# Patient Record
Sex: MALE | Race: WHITE | NOT HISPANIC OR LATINO | Employment: FULL TIME | URBAN - METROPOLITAN AREA
[De-identification: names, ages, dates, MRNs, and addresses within clinical notes are randomized per-mention and may not be internally consistent; named-entity substitution may affect disease eponyms.]

---

## 2023-09-21 PROBLEM — R31.9 HEMATURIA: Status: ACTIVE | Noted: 2023-09-21

## 2023-09-21 PROBLEM — R73.03 PREDIABETES: Status: ACTIVE | Noted: 2023-09-21

## 2023-09-21 PROBLEM — E66.3 OVERWEIGHT (BMI 25.0-29.9): Status: ACTIVE | Noted: 2023-09-21

## 2023-09-21 PROBLEM — R03.0 ELEVATED BLOOD PRESSURE READING: Status: ACTIVE | Noted: 2023-09-21

## 2023-09-21 PROBLEM — C67.9 BLADDER CANCER (MULTI): Status: ACTIVE | Noted: 2023-09-21

## 2023-09-21 PROBLEM — N28.1 RENAL CYST: Status: ACTIVE | Noted: 2023-09-21

## 2023-09-21 RX ORDER — PENICILLIN V POTASSIUM 500 MG/1
500 TABLET, FILM COATED ORAL
COMMUNITY
Start: 2022-07-15 | End: 2023-10-12

## 2023-10-12 ENCOUNTER — OFFICE VISIT (OUTPATIENT)
Dept: OTOLARYNGOLOGY | Facility: CLINIC | Age: 68
End: 2023-10-12
Payer: MEDICARE

## 2023-10-12 VITALS — WEIGHT: 215 LBS | BODY MASS INDEX: 28.37 KG/M2

## 2023-10-12 DIAGNOSIS — Z71.1 FEARED CONDITION NOT DEMONSTRATED: Primary | ICD-10-CM

## 2023-10-12 PROCEDURE — 99203 OFFICE O/P NEW LOW 30 MIN: CPT | Performed by: GENERAL PRACTICE

## 2023-10-12 PROCEDURE — 1159F MED LIST DOCD IN RCRD: CPT | Performed by: GENERAL PRACTICE

## 2023-10-12 PROCEDURE — 1036F TOBACCO NON-USER: CPT | Performed by: GENERAL PRACTICE

## 2023-10-12 ASSESSMENT — PATIENT HEALTH QUESTIONNAIRE - PHQ9
SUM OF ALL RESPONSES TO PHQ9 QUESTIONS 1 AND 2: 0
2. FEELING DOWN, DEPRESSED OR HOPELESS: NOT AT ALL
1. LITTLE INTEREST OR PLEASURE IN DOING THINGS: NOT AT ALL

## 2023-10-14 NOTE — PROGRESS NOTES
Otolaryngology - Head and Neck Surgery Outpatient New Patient Visit Note         History Of Present Illness  Eron Kaufman is a 68 y.o. male presenting for evaluation of  of concerns for cerumen impaction.     Reports history of cerumen impaction and some recent ear fullness.  Mild hearing loss     No otalgia, otorrhea, vertigo.     Denies significant history fo recurrent otitis, prior ear trauma or ear surgery,             Past Medical History  He has a past medical history of Other conditions influencing health status.    Surgical History  He has a past surgical history that includes Other surgical history (10/14/2021).     Social History  He reports that he has never smoked. He has never used smokeless tobacco. No history on file for alcohol use and drug use.    Family History  Family History   Problem Relation Name Age of Onset    No Known Problems Mother      No Known Problems Father          Allergies  Patient has no known allergies.    Review of Systems  ROS: Pertinent positives as noted in HPI.    - CONSTITUTIONAL: Does not report weight loss, fever or chills.    - HEENT:   Ear: Does not report tinnitus, vertigo,    , otalgia, otorrhea  Nose: Does not report congestion, rhinorrhea, epistaxis, decreased smell  Throat: Does not report pain, dysphagia, odynophagia  Larynx: Does not report hoarseness,  difficulty breathing, pain with speaking (odynophonia)  Neck: Does not report new masses, pain, swelling  Face: Does not report sinus pain, pressure, swelling, numbness, weakness     - RESPIRATORY: Does not report SOB or cough.    - CV: Does not report palpitations or chest pain.     - GI: Does not report abdominal pain, nausea, vomiting or diarrhea.    - : Does not report dysuria or urinary frequency.    - MSK: Does not report myalgia or joint pain.    - SKIN: Does not report rash or pruritus.    - NEUROLOGICAL: Does not report headache or syncope.    - PSYCHIATRIC: Does not report recent changes in mood. Does  not report anxiety or depression.         Physical Exam:     GENERAL:   Alert & Oriented to person, place and time; Normal affect and appearance. Well developed and well nourished. Conversant & cooperative with examination.     HEAD:   Normocephalic, atraumatic. No sinus tenderness to palpation. Normal parotid bilaterally. Normal facial strength.     NEUROLOGIC:   Cranial nerves II-XII grossly intact, gait WNL. Normal mood and affect.    EYES:   Extraocular movements intact. Pupils equal, round, reactive to light and accommodation. No nystagmus, no ptosis. no scleral injection.    EAR:   Normal auricle. No discomfort or TTP with manipulation.   Handheld otoscopic exam showed normal external auditory canals bilaterally. No purulence or EAC inflammation. Minimal cerumen.   Right tympanic membrane clear and mobile without evidence of perforation, retraction or middle ear effusion.   Left tympanic membrane clear and mobile without evidence of perforation, retraction or middle ear effusion.     NOSE:   No external deformity. No external nasal lesions, lacerations, or scars. Nasal tip symmetrical with normal nasal valves.   Nasal cavity with essentially midline septum, normal mucosa and turbinates. No lesions, masses, purulence or polyps.     OC/OP:   Mucous membranes moist, no masses, lesions or exudates.   Normal tongue, floor of mouth, teeth, gums, lips. Normal posterior pharyngeal wall.    Normal tonsils without erythema, exudate or obvious calculi     NECK:   No neck masses or thyroid enlargement. Trachea midline. No tenderness to palpation    LYMPHATIC:   No cervical lymphadenopathy.     RESPIRATORY:   Symmetric chest elevation & no retractions. No significant hoarseness. No increased work of breathing.    CV:   No clubbing or cyanosis. No obvious edema    Skin:   No facial rashes, vesicles or lesions.     Extremities:   No gross abnormalities      Clinic Procedure        Information review:  External sources  (notes, imaging, lab results) listed below personally reviewed to aid in medical decision making process.  -  -  -      Assessment/Plan   Problem List Items Addressed This Visit    None  Visit Diagnoses         Codes    Feared condition not demonstrated    -  Primary Z71.1          Concerns for cerumen, but ears clear and no evidence of otitis, ETD.  Discussed possible underlying hearing loss and possible audiogram, but pt declines.  Discussed possible TMJ, neck tension or other etiologies causing aural fullness.  Pt will observe and RTC if symptoms persist.       Follow up:  -plan for follow up in clinic as needed    All of the above findings, impressions, treatment planning and follow up plans were discussed with the patient who indicated understanding.  the patient was instructed to contact or return to clinic sooner if symptoms/signs persist or worsen despite the above management.      Miguel Stafford MD  Otolaryngology - Head and Neck Surgery

## 2023-10-19 NOTE — PROGRESS NOTES
Patient:   Eron Kaufman is a 68 y.o. year old male patient here for 6-month FUV       Hx:  high grade bladder cancer s/p BCG treatments, diagnosed 09/2017 with no recurrences since, and Bosniak type 2 right renal cyst.     Medications:     Recent PSA: 0.78 on 4/6/2022    Recent imaging:    Patient concerns and symptoms:    Cystoscopy was completed today due to TCC surveillance and was unremarkable. Cytology sent. Patient instructed to followup in 6 months for continued cystoscopic surveillance. Will continue to monitor 6 months until 9/2025, and yearly if no recurrences by year 5.     Plan: Follow up for repeat cystoscopy in 6 months with a renal US prior.    Scribe Attestation:  By signing my name below, Oanh MENCHACA Scribe   attest that this documentation has been prepared under the direction and in the presence of Manohar Hurtado MD.

## 2023-10-20 ENCOUNTER — PROCEDURE VISIT (OUTPATIENT)
Dept: UROLOGY | Facility: HOSPITAL | Age: 68
End: 2023-10-20
Payer: MEDICARE

## 2023-10-20 VITALS
HEART RATE: 57 BPM | BODY MASS INDEX: 29.12 KG/M2 | SYSTOLIC BLOOD PRESSURE: 149 MMHG | WEIGHT: 215 LBS | HEIGHT: 72 IN | DIASTOLIC BLOOD PRESSURE: 100 MMHG

## 2023-10-20 DIAGNOSIS — C67.9 MALIGNANT NEOPLASM OF URINARY BLADDER, UNSPECIFIED SITE (MULTI): Primary | ICD-10-CM

## 2023-10-20 LAB
POC APPEARANCE, URINE: CLEAR
POC BILIRUBIN, URINE: NEGATIVE
POC BLOOD, URINE: NEGATIVE
POC COLOR, URINE: YELLOW
POC GLUCOSE, URINE: NEGATIVE MG/DL
POC KETONES, URINE: NEGATIVE MG/DL
POC LEUKOCYTES, URINE: NEGATIVE
POC NITRITE,URINE: NEGATIVE
POC PH, URINE: 5.5 PH
POC PROTEIN, URINE: NEGATIVE MG/DL
POC SPECIFIC GRAVITY, URINE: >=1.03
POC UROBILINOGEN, URINE: 0.2 EU/DL

## 2023-10-20 PROCEDURE — 52000 CYSTOURETHROSCOPY: CPT | Performed by: UROLOGY

## 2023-10-20 PROCEDURE — 81003 URINALYSIS AUTO W/O SCOPE: CPT | Mod: QW | Performed by: UROLOGY

## 2023-10-20 PROCEDURE — 88112 CYTOPATH CELL ENHANCE TECH: CPT | Mod: TC,MCY | Performed by: UROLOGY

## 2023-10-20 PROCEDURE — 88112 CYTOPATH CELL ENHANCE TECH: CPT | Performed by: PATHOLOGY

## 2023-10-26 LAB
LABORATORY COMMENT REPORT: NORMAL
LABORATORY COMMENT REPORT: NORMAL
PATH REPORT.FINAL DX SPEC: NORMAL
PATH REPORT.GROSS SPEC: NORMAL
PATH REPORT.RELEVANT HX SPEC: NORMAL
PATH REPORT.TOTAL CANCER: NORMAL

## 2023-12-11 DIAGNOSIS — C67.9 MALIGNANT NEOPLASM OF URINARY BLADDER, UNSPECIFIED SITE (MULTI): ICD-10-CM

## 2024-04-01 ENCOUNTER — HOSPITAL ENCOUNTER (OUTPATIENT)
Dept: RADIOLOGY | Facility: CLINIC | Age: 69
Discharge: HOME | End: 2024-04-01
Payer: MEDICARE

## 2024-04-01 DIAGNOSIS — C67.9 MALIGNANT NEOPLASM OF URINARY BLADDER, UNSPECIFIED SITE (MULTI): ICD-10-CM

## 2024-04-01 PROCEDURE — 76770 US EXAM ABDO BACK WALL COMP: CPT | Performed by: STUDENT IN AN ORGANIZED HEALTH CARE EDUCATION/TRAINING PROGRAM

## 2024-04-01 PROCEDURE — 76770 US EXAM ABDO BACK WALL COMP: CPT

## 2024-04-02 NOTE — PROGRESS NOTES
"FUV    Last visit - 10/20/23     HISTORY OF PRESENT ILLNESS:   Eron Kaufman is a 69 y.o. male who is being seen today for continued cystoscopic surveillance.    PAST MEDICAL HISTORY:  Past Medical History:   Diagnosis Date    Other conditions influencing health status     No significant past medical history   - High grade bladder cancer s/p BCG treatments, diagnosed 09/2017 with no recurrences since  - Bosniak type 2 right renal cyst    PAST SURGICAL HISTORY:  Past Surgical History:   Procedure Laterality Date    OTHER SURGICAL HISTORY  10/14/2021    Bladder surgery     ALLERGIES:   No Known Allergies     MEDICATIONS:   No current outpatient medications     PHYSICAL EXAM:  There were no vitals taken for this visit.  Constitutional: Patient appears well-developed and well-nourished. No distress.    Pulmonary/Chest: Effort normal. No respiratory distress.   Abdominal: Soft, ND NT  : WNL  Musculoskeletal: Normal range of motion.    Neurological: Alert and oriented to person, place, and time.  Psychiatric: Normal mood and affect. Behavior is normal. Thought content normal.      Labs:  No results found for: \"TESTOSTERONE\"  Lab Results   Component Value Date    PSA 0.78 04/06/2022     No components found for: \"CBC\"  Lab Results   Component Value Date    CREATININE 1.10 04/06/2022     No components found for: \"TESTOTMS\"  No results found for: \"TESTF\"    Imaging:  - Renal US on 4/1/24 demonstrated Ill-defined right midpole cortical predominant hypoechoic area measuring 2.5 cm, not demonstrated on the prior CT kidney but shown  on the prior renal ultrasound. This could represent a prominent calyx versus occult mass. Consider further evaluation with contrast-enhanced MRI kidney.  - Results reviewed with patient. Patient reassured.     Discussion:  Doing well, no complaints. Denies any dysuria, hematuria, bothersome urinary frequency, urgency, or flank pain. Cystoscopy was completed today due to TCC surveillance and was " unremarkable. Cytology sent. Patient instructed to followup in 6 months for continued cystoscopic surveillance. Will plan for triple phase CT prior to 6 month cystoscopy follow up.    Cystoscopy schedule: q6 months until 9/2025, and yearly if no recurrences by year 5.     Assessment:    No diagnosis found.  Bladder cancer  Eron Kaufman is a 69 y.o. male here for FUV     Plan:   1) Follow up for repeat cystoscopy in 6 months with a triple phase CT prior.  2) All questions and concerns were addressed. Patient verbalizes understanding and has no other questions at this time.     Scribe Attestation:  By signing my name below, Michelle MENCHACA Scribe   attest that this documentation has been prepared under the direction and in the presence of Manohar Hurtado MD.

## 2024-04-04 ENCOUNTER — PROCEDURE VISIT (OUTPATIENT)
Dept: UROLOGY | Facility: HOSPITAL | Age: 69
End: 2024-04-04
Payer: MEDICARE

## 2024-04-04 VITALS — SYSTOLIC BLOOD PRESSURE: 164 MMHG | DIASTOLIC BLOOD PRESSURE: 95 MMHG | HEART RATE: 114 BPM

## 2024-04-04 DIAGNOSIS — Z79.2 PROPHYLACTIC ANTIBIOTIC: ICD-10-CM

## 2024-04-04 DIAGNOSIS — C67.9 MALIGNANT NEOPLASM OF URINARY BLADDER, UNSPECIFIED SITE (MULTI): Primary | ICD-10-CM

## 2024-04-04 PROCEDURE — 52000 CYSTOURETHROSCOPY: CPT | Performed by: UROLOGY

## 2024-04-04 PROCEDURE — 88112 CYTOPATH CELL ENHANCE TECH: CPT | Mod: TC,MCY | Performed by: UROLOGY

## 2024-04-04 PROCEDURE — 88112 CYTOPATH CELL ENHANCE TECH: CPT | Mod: TC,59 | Performed by: PATHOLOGY

## 2024-04-04 PROCEDURE — 99214 OFFICE O/P EST MOD 30 MIN: CPT | Performed by: UROLOGY

## 2024-04-04 PROCEDURE — 81003 URINALYSIS AUTO W/O SCOPE: CPT | Performed by: UROLOGY

## 2024-04-04 RX ORDER — CIPROFLOXACIN 500 MG/1
500 TABLET ORAL ONCE
Status: SHIPPED | OUTPATIENT
Start: 2024-04-04

## 2024-09-19 ENCOUNTER — LAB (OUTPATIENT)
Dept: LAB | Facility: LAB | Age: 69
End: 2024-09-19
Payer: MEDICARE

## 2024-09-19 DIAGNOSIS — C67.9 MALIGNANT NEOPLASM OF URINARY BLADDER, UNSPECIFIED SITE (MULTI): ICD-10-CM

## 2024-09-19 LAB
CREAT SERPL-MCNC: 1.12 MG/DL (ref 0.5–1.3)
EGFRCR SERPLBLD CKD-EPI 2021: 72 ML/MIN/1.73M*2

## 2024-09-19 PROCEDURE — 82565 ASSAY OF CREATININE: CPT

## 2024-09-19 PROCEDURE — 36415 COLL VENOUS BLD VENIPUNCTURE: CPT

## 2024-09-23 ENCOUNTER — HOSPITAL ENCOUNTER (OUTPATIENT)
Dept: RADIOLOGY | Facility: CLINIC | Age: 69
Discharge: HOME | End: 2024-09-23
Payer: MEDICARE

## 2024-09-23 DIAGNOSIS — C67.9 MALIGNANT NEOPLASM OF URINARY BLADDER, UNSPECIFIED SITE (MULTI): ICD-10-CM

## 2024-09-23 PROCEDURE — 2550000001 HC RX 255 CONTRASTS: Performed by: UROLOGY

## 2024-09-23 PROCEDURE — 74170 CT ABD WO CNTRST FLWD CNTRST: CPT | Performed by: RADIOLOGY

## 2024-09-23 PROCEDURE — 74170 CT ABD WO CNTRST FLWD CNTRST: CPT

## 2024-10-03 ENCOUNTER — APPOINTMENT (OUTPATIENT)
Dept: UROLOGY | Facility: HOSPITAL | Age: 69
End: 2024-10-03
Payer: MEDICARE

## 2024-10-15 RX ORDER — CIPROFLOXACIN 500 MG/1
500 TABLET ORAL ONCE
Status: COMPLETED | OUTPATIENT
Start: 2024-10-15 | End: 2024-10-18

## 2024-10-18 ENCOUNTER — PROCEDURE VISIT (OUTPATIENT)
Dept: UROLOGY | Facility: HOSPITAL | Age: 69
End: 2024-10-18
Payer: MEDICARE

## 2024-10-18 VITALS — SYSTOLIC BLOOD PRESSURE: 168 MMHG | DIASTOLIC BLOOD PRESSURE: 97 MMHG | HEART RATE: 84 BPM

## 2024-10-18 DIAGNOSIS — C67.9 MALIGNANT NEOPLASM OF URINARY BLADDER, UNSPECIFIED SITE (MULTI): Primary | ICD-10-CM

## 2024-10-18 DIAGNOSIS — Z79.2 PROPHYLACTIC ANTIBIOTIC: ICD-10-CM

## 2024-10-18 PROCEDURE — 81003 URINALYSIS AUTO W/O SCOPE: CPT | Performed by: UROLOGY

## 2024-10-18 PROCEDURE — 52000 CYSTOURETHROSCOPY: CPT | Performed by: UROLOGY

## 2024-10-18 PROCEDURE — 2500000001 HC RX 250 WO HCPCS SELF ADMINISTERED DRUGS (ALT 637 FOR MEDICARE OP): Performed by: UROLOGY

## 2024-10-18 NOTE — PROGRESS NOTES
"FUV    Last visit - 04/04/24     HISTORY OF PRESENT ILLNESS:   Eron Kaufman is a 69 y.o. male who is being seen today for results follow up.    PAST MEDICAL HISTORY:  Past Medical History:   Diagnosis Date    Other conditions influencing health status     No significant past medical history   - High grade bladder cancer s/p BCG treatments, diagnosed 09/2017 with no recurrences since  - Bosniak type 2 right renal cyst    PAST SURGICAL HISTORY:  Past Surgical History:   Procedure Laterality Date    OTHER SURGICAL HISTORY  10/14/2021    Bladder surgery     ALLERGIES:   No Known Allergies     MEDICATIONS:   No current outpatient medications     PHYSICAL EXAM:  Blood pressure (!) 168/97, pulse 84.  Constitutional: Patient appears well-developed and well-nourished. No distress.    Pulmonary/Chest: Effort normal. No respiratory distress.   Abdominal: Soft, ND NT  : WNL  Musculoskeletal: Normal range of motion.    Neurological: Alert and oriented to person, place, and time.  Psychiatric: Normal mood and affect. Behavior is normal. Thought content normal.      Labs:  No results found for: \"TESTOSTERONE\"  Lab Results   Component Value Date    PSA 0.78 04/06/2022     No components found for: \"CBC\"  Lab Results   Component Value Date    CREATININE 1.12 09/19/2024     No components found for: \"TESTOTMS\"  No results found for: \"TESTF\"    Imaging:  -Renal CT w and w/o contrast demonstrated:   1. At least 3 small subcentimeter low-attenuation lesions in the  right kidney which may represent cysts but too small to fully  characterize. Correlation with ultrasound findings recommended.  2. Normal appearance of the left kidney.  3. Lorena mesentery appearance similar to prior and subcentimeter  periportal and peripancreatic lymph nodes, nonspecific in etiology.  Findings overall stable.  4. Circumferential wall thickening of the terminal ileum which is  incompletely included in field of view and may represent a sequela of  peristalsis, " however, inflammatory or neoplastic process not  excluded. Recommend clinical correlation and dedicated CT scan of the  abdomen and/or colonoscopy.  -Results reviewed with patient today.     - Renal US on 4/1/24 demonstrated Ill-defined right midpole cortical predominant hypoechoic area measuring 2.5 cm, not demonstrated on the prior CT kidney but shown  on the prior renal ultrasound. This could represent a prominent calyx versus occult mass. Consider further evaluation with contrast-enhanced MRI kidney.  - Results reviewed with patient. Patient reassured.     Discussion:  Doing well, no complaints. Denies any dysuria, hematuria, bothersome urinary frequency, urgency, or flank pain. We discussed his CT results prior to completing his cystoscopy today.   His CT showed some stable cysts but no other abnormalities. There is persistent apperance of inflammation of the intestine. Patient was encouraged to follow up with his PCP to determine if further evaluation is necessary. We are going to continue with his current surveillance. He will complete a PSA panel prior to his next follow up.     Informed consent was obtained.  He was prepped and draped in a standard sterile fashion.  Flexible cystoscope was advanced per urethra.  Pendulous urethra was normal.  Bulbar urethra was normal.  External sphincter had normal strength.  Prostate was short and nonobstructive.  Bladder was entered and distended.  There were no intravesical lesions, foreign bodies, stones, or tumors.  The scope was removed.  Patient tolerated the procedure well.    Cystoscopy schedule: q6 months until 9/2025, and yearly if no recurrences by year 5.     Assessment:      1. Malignant neoplasm of urinary bladder, unspecified site (Multi)  Cytology Consultation (Non-Gynecologic)    Cystoscopy    Cystoscopy    Cystoscopy    POCT UA Automated manually resulted    CANCELED: Measure post void residual      2. Prophylactic antibiotic  ciprofloxacin (Cipro)  tablet 500 mg        Bladder cancer  Eron Kaufman is a 69 y.o. male here for FUV     Plan:   1) Follow up for repeat cystoscopy in 6 months with PSA panel prior  2) All questions and concerns were addressed. Patient verbalizes understanding and has no other questions at this time.     Scribe Attestation  By signing my name below, I, Zenaida Rodríguez   attest that this documentation has been prepared under the direction and in the presence of Manohar Hurtado MD.

## 2024-10-22 LAB
LABORATORY COMMENT REPORT: NORMAL
LABORATORY COMMENT REPORT: NORMAL
PATH REPORT.FINAL DX SPEC: NORMAL
PATH REPORT.RELEVANT HX SPEC: NORMAL
PATH REPORT.TOTAL CANCER: NORMAL

## 2025-04-09 RX ORDER — CIPROFLOXACIN 500 MG/1
500 TABLET ORAL ONCE
Status: COMPLETED | OUTPATIENT
Start: 2025-04-11 | End: 2025-04-11

## 2025-04-10 NOTE — PROGRESS NOTES
"FUV    Last visit - 10/18/24     HISTORY OF PRESENT ILLNESS:   Eorn Kaufman is a 70 y.o. male who is being seen today for cystoscopy and review PSA results    PAST MEDICAL HISTORY:  Past Medical History:   Diagnosis Date    Other conditions influencing health status     No significant past medical history   - High grade bladder cancer s/p BCG treatments, diagnosed 09/2017 with no recurrences since  - Bosniak type 2 right renal cyst    PAST SURGICAL HISTORY:  Past Surgical History:   Procedure Laterality Date    OTHER SURGICAL HISTORY  10/14/2021    Bladder surgery     ALLERGIES:   No Known Allergies     MEDICATIONS:   No current outpatient medications     PHYSICAL EXAM:  Blood pressure (!) 141/98, pulse 73.  Constitutional: Patient appears well-developed and well-nourished. No distress.    Pulmonary/Chest: Effort normal. No respiratory distress.   Abdominal: Soft, ND NT  : WNL  Musculoskeletal: Normal range of motion.    Neurological: Alert and oriented to person, place, and time.  Psychiatric: Normal mood and affect. Behavior is normal. Thought content normal.      Labs:  No results found for: \"TESTOSTERONE\"  Lab Results   Component Value Date    PSA 0.78 04/06/2022     No components found for: \"CBC\"  Lab Results   Component Value Date    CREATININE 1.12 09/19/2024     No components found for: \"TESTOTMS\"  No results found for: \"TESTF\"    Imaging:  -Renal CT w and w/o contrast demonstrated:   1. At least 3 small subcentimeter low-attenuation lesions in the  right kidney which may represent cysts but too small to fully  characterize. Correlation with ultrasound findings recommended.  2. Normal appearance of the left kidney.  3. Lorena mesentery appearance similar to prior and subcentimeter  periportal and peripancreatic lymph nodes, nonspecific in etiology.  Findings overall stable.  4. Circumferential wall thickening of the terminal ileum which is  incompletely included in field of view and may represent a sequela " of  peristalsis, however, inflammatory or neoplastic process not  excluded. Recommend clinical correlation and dedicated CT scan of the  abdomen and/or colonoscopy.  -Results reviewed with patient today.     - Renal US on 4/1/24 demonstrated Ill-defined right midpole cortical predominant hypoechoic area measuring 2.5 cm, not demonstrated on the prior CT kidney but shown  on the prior renal ultrasound. This could represent a prominent calyx versus occult mass. Consider further evaluation with contrast-enhanced MRI kidney.      Discussion:  Doing well, no complaints. Denies any dysuria, hematuria, bothersome urinary frequency, urgency, or flank pain. The cystoscopy was completed today due to bladder cancer and demonstrated no tumors, stones or lesions. Cytology sent. 1 abx given to patient in clinic today. Patient instructed to follow up in 6 months for continued surveillance.   We are going to continue with his current surveillance. He will complete a renal US and a PSA panel prior to his next follow up.       Cystoscopy schedule: q6 months until 9/2025, and yearly if no recurrences by year 5.     Assessment:      1. Malignant neoplasm of urinary bladder, unspecified site (Multi)  Cystoscopy    Cytology Consultation (Non-Gynecologic)    Cystoscopy    POCT UA Automated manually resulted    CANCELED: Measure post void residual      2. Prophylactic antibiotic  ciprofloxacin (Cipro) tablet 500 mg        Bladder cancer  Eron Kaufman is a 70 y.o. male here for FUV     Plan:   1) Follow up for repeat cystoscopy in 6 months with PSA panel and renal US prior  2) All questions and concerns were addressed. Patient verbalizes understanding and has no other questions at this time.     Scribe Attestation  By signing my name below, IOanh Scribe   attest that this documentation has been prepared under the direction and in the presence of Manohar Hurtado MD.

## 2025-04-11 ENCOUNTER — PROCEDURE VISIT (OUTPATIENT)
Dept: UROLOGY | Facility: HOSPITAL | Age: 70
End: 2025-04-11
Payer: MEDICARE

## 2025-04-11 VITALS — DIASTOLIC BLOOD PRESSURE: 98 MMHG | SYSTOLIC BLOOD PRESSURE: 141 MMHG | HEART RATE: 73 BPM

## 2025-04-11 DIAGNOSIS — Z12.5 SCREENING FOR PROSTATE CANCER: ICD-10-CM

## 2025-04-11 DIAGNOSIS — Z79.2 PROPHYLACTIC ANTIBIOTIC: ICD-10-CM

## 2025-04-11 DIAGNOSIS — C67.9 MALIGNANT NEOPLASM OF URINARY BLADDER, UNSPECIFIED SITE (MULTI): Primary | ICD-10-CM

## 2025-04-11 LAB
POC APPEARANCE, URINE: CLEAR
POC BILIRUBIN, URINE: NEGATIVE
POC BLOOD, URINE: NEGATIVE
POC COLOR, URINE: YELLOW
POC GLUCOSE, URINE: NEGATIVE MG/DL
POC KETONES, URINE: NEGATIVE MG/DL
POC LEUKOCYTES, URINE: NEGATIVE
POC NITRITE,URINE: NEGATIVE
POC PH, URINE: 6 PH
POC PROTEIN, URINE: ABNORMAL MG/DL
POC SPECIFIC GRAVITY, URINE: >=1.03
POC UROBILINOGEN, URINE: 0.2 EU/DL

## 2025-04-11 PROCEDURE — 52000 CYSTOURETHROSCOPY: CPT | Performed by: UROLOGY

## 2025-04-11 PROCEDURE — 2500000001 HC RX 250 WO HCPCS SELF ADMINISTERED DRUGS (ALT 637 FOR MEDICARE OP): Performed by: UROLOGY

## 2025-04-11 PROCEDURE — 81003 URINALYSIS AUTO W/O SCOPE: CPT | Mod: QW | Performed by: UROLOGY

## 2025-04-11 RX ADMIN — CIPROFLOXACIN HYDROCHLORIDE 500 MG: 500 TABLET, FILM COATED ORAL at 10:25

## 2025-04-11 ASSESSMENT — PATIENT HEALTH QUESTIONNAIRE - PHQ9
2. FEELING DOWN, DEPRESSED OR HOPELESS: NOT AT ALL
1. LITTLE INTEREST OR PLEASURE IN DOING THINGS: NOT AT ALL
SUM OF ALL RESPONSES TO PHQ9 QUESTIONS 1 AND 2: 0
